# Patient Record
Sex: MALE | Employment: STUDENT
[De-identification: names, ages, dates, MRNs, and addresses within clinical notes are randomized per-mention and may not be internally consistent; named-entity substitution may affect disease eponyms.]

---

## 2017-06-03 ENCOUNTER — HEALTH MAINTENANCE LETTER (OUTPATIENT)
Age: 22
End: 2017-06-03

## 2020-01-28 ENCOUNTER — OFFICE VISIT (OUTPATIENT)
Dept: FAMILY MEDICINE | Facility: CLINIC | Age: 25
End: 2020-01-28
Payer: COMMERCIAL

## 2020-01-28 VITALS
SYSTOLIC BLOOD PRESSURE: 130 MMHG | HEART RATE: 100 BPM | RESPIRATION RATE: 14 BRPM | WEIGHT: 312 LBS | BODY MASS INDEX: 43.68 KG/M2 | DIASTOLIC BLOOD PRESSURE: 80 MMHG | TEMPERATURE: 98 F | OXYGEN SATURATION: 96 % | HEIGHT: 71 IN

## 2020-01-28 DIAGNOSIS — R53.83 OTHER FATIGUE: Primary | ICD-10-CM

## 2020-01-28 DIAGNOSIS — E66.01 MORBID OBESITY (H): Chronic | ICD-10-CM

## 2020-01-28 LAB
ERYTHROCYTE [DISTWIDTH] IN BLOOD BY AUTOMATED COUNT: 12.7 % (ref 10–15)
HBA1C MFR BLD: 5.6 % (ref 0–5.6)
HCT VFR BLD AUTO: 46.5 % (ref 40–53)
HGB BLD-MCNC: 15.9 G/DL (ref 13.3–17.7)
MCH RBC QN AUTO: 28.4 PG (ref 26.5–33)
MCHC RBC AUTO-ENTMCNC: 34.2 G/DL (ref 31.5–36.5)
MCV RBC AUTO: 83 FL (ref 78–100)
PLATELET # BLD AUTO: 305 10E9/L (ref 150–450)
RBC # BLD AUTO: 5.59 10E12/L (ref 4.4–5.9)
WBC # BLD AUTO: 8.8 10E9/L (ref 4–11)

## 2020-01-28 PROCEDURE — 36415 COLL VENOUS BLD VENIPUNCTURE: CPT | Performed by: PHYSICIAN ASSISTANT

## 2020-01-28 PROCEDURE — 99204 OFFICE O/P NEW MOD 45 MIN: CPT | Performed by: PHYSICIAN ASSISTANT

## 2020-01-28 PROCEDURE — 85027 COMPLETE CBC AUTOMATED: CPT | Performed by: PHYSICIAN ASSISTANT

## 2020-01-28 PROCEDURE — 83036 HEMOGLOBIN GLYCOSYLATED A1C: CPT | Performed by: PHYSICIAN ASSISTANT

## 2020-01-28 PROCEDURE — 84443 ASSAY THYROID STIM HORMONE: CPT | Performed by: PHYSICIAN ASSISTANT

## 2020-01-28 ASSESSMENT — MIFFLIN-ST. JEOR: SCORE: 2427.35

## 2020-01-28 NOTE — PATIENT INSTRUCTIONS
Patient Education   Preventing Diabetes  What is diabetes?  Diabetes is a disease that causes high blood sugar. Over time, high blood sugar can lead to a number of health problems if not treated. You can take action to prevent diabetes.  You are at risk for diabetes if you:    Are overweight    Don't get enough exercise    Have a parent, brother or sister with diabetes    Are , , ,  American or     Have high blood pressure (over 130/80)    Have low HDL cholesterol (35 or lower)    Have high triglycerides (150 or higher)    Are over age 45    Have had a baby weighing more than 9 pounds    Have had gestational diabetes (diabetes during pregnancy)    Have PCOS (polycystic ovary syndrome)    Have pre-diabetes  What is pre-diabetes?  Pre-diabetes is when your blood sugar level is higher than normal, but not high enough to be called diabetes.  Pre-diabetes will usually turn into diabetes in a short time if you do not change your health habits.  Damage to your body, especially the heart and blood vessels, may already be occurring with pre-diabetes. If you have pre-diabetes, it is important to start making healthy choices now.  How can I reduce my risk of getting diabetes?  You can prevent or delay getting diabetes with healthy lifestyle choices. These steps can reduce the risk for diabetes in 6 out of 10 people:   1. Exercise 30 minutes a day, at least five times per week (or 150 minutes of exercise per week).  2. Lose weight if you need to. If you are overweight, losing just 5 to 10% of your body weight (an average of 15 pounds) may reduce your risk.  3. Cut back calories in your diet. Choose healthy, nutritious, and unprocessed foods (lean meats, plant proteins, whole grains, fruits and vegetables).  If your doctor diagnosed you with pre-diabetes, you should also see your doctor every year to check for diabetes.  How would I know if I had diabetes?  To check for  diabetes, your doctor will do a blood test to measure your blood sugar. You cannot eat or drink anything for several hours before this test.    If your blood sugar is less than 100, you do not have diabetes.    If it is between 100 and 125, you have pre-diabetes.    If it is 126 or higher on two different days, you have diabetes.  The doctor may order more tests to confirm the results.  What should I do if I get diabetes?  If not managed, diabetes can lead to blindness, kidney failure, nerve damage, heart attack or stroke.  You will greatly reduce your risks if you manage your diabetes. Managing diabetes includes eating healthy, getting more exercise, checking blood sugar and often taking diabetes medicine.  It is also important to:    Learn as much as you can about diabetes. This will give you the tools you need to build healthy habits. Be sure to meet with a diabetes educator.    Work closely with a doctor who understands diabetes.    Get support from family and friends. Support is vital when you are making changes in your life.  How can I find a diabetes prevention and education program?  [facility] offers complete diabetes education, including diabetes prevention and ongoing care. Call [phone] for details or to schedule a visit.  The Centers for Disease Control has a listing of all of the Diabetes Prevention Programs available. Go to https://nccd.cdc.gov/DDT_DPRP/Programs.aspx.  Or call The American Diabetes Association at 4-319-DIABETES and ask for a program near you. You can also check their website at www.diabetes.org.  For informational purposes only. Not to replace the advice of your health care provider.  Clinically reviewed by [facility].  Copyright   2008 [facility]. All rights reserved. [employer] [id number] - REV 02/19.       Patient Education     A1C  Does this test have other names?  Hemoglobin A1c; HbA1c; glycosylated hemoglobin; glycohemoglobin;  Glycated hemoglobin  What is this test?  A1C is a blood test that shows average blood sugar (glucose) levels over the last 3 months. The test is done to find out if a person has diabetes or prediabetes. It's also used to see how well a person with diabetes controls their blood sugar. The test can help guide diabetes treatment over time.  Why do I need this test?  You may need this test to check for prediabetes or diabetes.  If you have diabetes or prediabetes, you may need this test to see how well you control your blood sugar. People with diabetes need to track their blood sugar (glucose) levels every day to make sure they aren t too high or too low. The A1C test gives results for a longer period of time. It shows if your blood sugar has been too high on average over the last 3 months.   Glucose sticks to hemoglobin in the blood. Hemoglobin is a protein in red blood cells that carries oxygen. When blood sugar is high, more glucose builds up and sticks to the hemoglobin. The A1C test measures how much of the hemoglobin is coated with sugar.  You may have the test when a healthcare provider first works with you to treat your diabetes. You may then need to have the A1C test 2 or more times a year. This depends on the type of diabetes you have and how well it s controlled. The American Diabetes Association (ADA) advises an A1C test at least 2 times a year if you are meeting your blood sugar goals. If you aren t meeting your goals or your medicine has changed, you should have the A1C test more often.  What other tests might I have along with this test?   If your healthcare provider tests you for diabetes, you may also have any of these tests:    Fasting plasma glucose blood test (FPG)    Oral glucose tolerance test (OGTT)    Urine test to check for sugar, ketones, or protein  What do my test results mean?  Test results may vary depending on your age, gender, health history, the method used for the test, and other  things. Your test results may not mean you have a problem. Ask your healthcare provider what your test results mean for you.   A1C results are reported as a percentage. Here are what the results mean:    A1C below 5.7%. This is normal.    A1C from 5.7% to 6.4%. You may have prediabetes. This means you have a higher risk for diabetes in the future.    A1C of 6.5% or above on 2 separate tests. You may have diabetes.   The ADA says that people with diabetes should keep an A1C below 7%. The American Association of Clinical Endocrinologists advises an A1C of 6.5% or less. Your healthcare provider may give you other advice. This is based on your age, health conditions, and other things.    How is this test done?  The test is done with a blood sample. A needle is used to draw blood from a vein in your arm or hand.   Does this test pose any risks?  Having a blood test with a needle carries some risks. These include bleeding, infection, bruising, and feeling lightheaded. When the needle pricks your arm or hand, you may feel a slight sting or pain. Afterward, the site may be sore.  What might affect my test results?  Your blood sugar levels change throughout the day. This won't affect the A1C test result.  If you have sickle cell anemia or other blood disorders, an A1C test may be less useful for diagnosing or watching diabetes. Your healthcare provider may tell you to use a different test that will work better for you.  The test results may be less accurate if you have any of the below:    Anemia    Heavy bleeding    Iron deficiency    Kidney failure    Liver disease  How do I get ready for this test?  You don't need to get ready for the test.      4266-1627 [employer]. [address] [place] [address], [place] [address]. All rights reserved. This information is not intended as a substitute for professional medical care. Always follow your healthcare professional's instructions.

## 2020-01-28 NOTE — PROGRESS NOTES
"Subjective     Henrique Walker is a 24 year old male who presents to clinic today for the following health issues:    HPI   fatigue      Duration: several years but worse for past few weeks    Description (location/character/radiation): fatigue, dizzy and headache    Intensity:  mild    Accompanying signs and symptoms: none    History (similar episodes/previous evaluation): None    Precipitating or alleviating factors: None    Therapies tried and outcome: rest     Concerns regarding overall health, diet and weight.  Wants to be tested for diabetes.  Feeling fatigued for several years basically.  Says he knows his diet needs to be better.  Things have been worsening however in the past weeks.  Now he is having headaches and dizziness, so he is here mostly for lab testing.  No known FH of diabetes or thyroid problems.    Reviewed and updated as needed this visit by Provider         Review of Systems   ROS COMP: Constitutional, HEENT, cardiovascular, pulmonary, gi and gu systems are negative, except as otherwise noted.      Objective    /80 (BP Location: Right arm, Patient Position: Chair, Cuff Size: Adult Large)   Pulse 100   Temp 98  F (36.7  C) (Oral)   Resp 14   Ht 1.803 m (5' 11\")   Wt 141.5 kg (312 lb)   SpO2 96%   BMI 43.52 kg/m    Body mass index is 43.52 kg/m .  Physical Exam   GENERAL: healthy, alert and no distress  HENT: ear canals and TM's normal, nose and mouth without ulcers or lesions  NECK: no adenopathy, no asymmetry, masses, or scars and thyroid normal to palpation  RESP: lungs clear to auscultation - no rales, rhonchi or wheezes  CV: regular rate and rhythm, normal S1 S2, no S3 or S4, no murmur, click or rub, no peripheral edema and peripheral pulses strong  ABDOMEN: soft, nontender, no hepatosplenomegaly, no masses and bowel sounds normal  MS: no gross musculoskeletal defects noted, no edema  SKIN: no suspicious lesions or rashes  PSYCH: mentation appears normal, affect " "normal/bright and anxious    Diagnostic Test Results:  Labs reviewed in Epic  Results for orders placed or performed in visit on 01/28/20   CBC with platelets     Status: None   Result Value Ref Range    WBC 8.8 4.0 - 11.0 10e9/L    RBC Count 5.59 4.4 - 5.9 10e12/L    Hemoglobin 15.9 13.3 - 17.7 g/dL    Hematocrit 46.5 40.0 - 53.0 %    MCV 83 78 - 100 fl    MCH 28.4 26.5 - 33.0 pg    MCHC 34.2 31.5 - 36.5 g/dL    RDW 12.7 10.0 - 15.0 %    Platelet Count 305 150 - 450 10e9/L   Hemoglobin A1c     Status: None   Result Value Ref Range    Hemoglobin A1C 5.6 0 - 5.6 %   TSH with free T4 reflex     Status: None   Result Value Ref Range    TSH 2.08 0.40 - 4.00 mU/L           Assessment & Plan     1. Other fatigue  A1C and CBC are normal today.  Will await TSH.    - CBC with platelets  - Hemoglobin A1c  - TSH with free T4 reflex    2. obesity (H)  Discussed that diet and lifestyle likely are contributing to how he has been feeling.    - TSH with free T4 reflex     BMI:   Estimated body mass index is 43.52 kg/m  as calculated from the following:    Height as of this encounter: 1.803 m (5' 11\").    Weight as of this encounter: 141.5 kg (312 lb).   Weight management plan: Discussed healthy diet and exercise guidelines        Patient Instructions     Patient Education   Preventing Diabetes  What is diabetes?  Diabetes is a disease that causes high blood sugar. Over time, high blood sugar can lead to a number of health problems if not treated. You can take action to prevent diabetes.  You are at risk for diabetes if you:    Are overweight    Don't get enough exercise    Have a parent, brother or sister with diabetes    Are , , ,  American or     Have high blood pressure (over 130/80)    Have low HDL cholesterol (35 or lower)    Have high triglycerides (150 or higher)    Are over age 45    Have had a baby weighing more than 9 pounds    Have had gestational diabetes " (diabetes during pregnancy)    Have PCOS (polycystic ovary syndrome)    Have pre-diabetes  What is pre-diabetes?  Pre-diabetes is when your blood sugar level is higher than normal, but not high enough to be called diabetes.  Pre-diabetes will usually turn into diabetes in a short time if you do not change your health habits.  Damage to your body, especially the heart and blood vessels, may already be occurring with pre-diabetes. If you have pre-diabetes, it is important to start making healthy choices now.  How can I reduce my risk of getting diabetes?  You can prevent or delay getting diabetes with healthy lifestyle choices. These steps can reduce the risk for diabetes in 6 out of 10 people:   1. Exercise 30 minutes a day, at least five times per week (or 150 minutes of exercise per week).  2. Lose weight if you need to. If you are overweight, losing just 5 to 10% of your body weight (an average of 15 pounds) may reduce your risk.  3. Cut back calories in your diet. Choose healthy, nutritious, and unprocessed foods (lean meats, plant proteins, whole grains, fruits and vegetables).  If your doctor diagnosed you with pre-diabetes, you should also see your doctor every year to check for diabetes.  How would I know if I had diabetes?  To check for diabetes, your doctor will do a blood test to measure your blood sugar. You cannot eat or drink anything for several hours before this test.    If your blood sugar is less than 100, you do not have diabetes.    If it is between 100 and 125, you have pre-diabetes.    If it is 126 or higher on two different days, you have diabetes.  The doctor may order more tests to confirm the results.  What should I do if I get diabetes?  If not managed, diabetes can lead to blindness, kidney failure, nerve damage, heart attack or stroke.  You will greatly reduce your risks if you manage your diabetes. Managing diabetes includes eating healthy, getting more exercise, checking blood sugar and  often taking diabetes medicine.  It is also important to:    Learn as much as you can about diabetes. This will give you the tools you need to build healthy habits. Be sure to meet with a diabetes educator.    Work closely with a doctor who understands diabetes.    Get support from family and friends. Support is vital when you are making changes in your life.  How can I find a diabetes prevention and education program?  [facility] offers complete diabetes education, including diabetes prevention and ongoing care. Call [phone] for details or to schedule a visit.  The Centers for Disease Control has a listing of all of the Diabetes Prevention Programs available. Go to https://nccd.cdc.gov/DDT_DPRP/Programs.aspx.  Or call The American Diabetes Association at 6-657-DIABETES and ask for a program near you. You can also check their website at www.diabetes.org.  For informational purposes only. Not to replace the advice of your health care provider.  Clinically reviewed by [facility].  Copyright   2008 [facility]. All rights reserved. [employer] [id number] - REV 02/19.       Patient Education     A1C  Does this test have other names?  Hemoglobin A1c; HbA1c; glycosylated hemoglobin; glycohemoglobin; Glycated hemoglobin  What is this test?  A1C is a blood test that shows average blood sugar (glucose) levels over the last 3 months. The test is done to find out if a person has diabetes or prediabetes. It's also used to see how well a person with diabetes controls their blood sugar. The test can help guide diabetes treatment over time.  Why do I need this test?  You may need this test to check for prediabetes or diabetes.  If you have diabetes or prediabetes, you may need this test to see how well you control your blood sugar. People with diabetes need to track their blood sugar (glucose) levels every day to make sure they aren t too high or too low. The A1C test gives results for a  longer period of time. It shows if your blood sugar has been too high on average over the last 3 months.   Glucose sticks to hemoglobin in the blood. Hemoglobin is a protein in red blood cells that carries oxygen. When blood sugar is high, more glucose builds up and sticks to the hemoglobin. The A1C test measures how much of the hemoglobin is coated with sugar.  You may have the test when a healthcare provider first works with you to treat your diabetes. You may then need to have the A1C test 2 or more times a year. This depends on the type of diabetes you have and how well it s controlled. The American Diabetes Association (ADA) advises an A1C test at least 2 times a year if you are meeting your blood sugar goals. If you aren t meeting your goals or your medicine has changed, you should have the A1C test more often.  What other tests might I have along with this test?   If your healthcare provider tests you for diabetes, you may also have any of these tests:    Fasting plasma glucose blood test (FPG)    Oral glucose tolerance test (OGTT)    Urine test to check for sugar, ketones, or protein  What do my test results mean?  Test results may vary depending on your age, gender, health history, the method used for the test, and other things. Your test results may not mean you have a problem. Ask your healthcare provider what your test results mean for you.   A1C results are reported as a percentage. Here are what the results mean:    A1C below 5.7%. This is normal.    A1C from 5.7% to 6.4%. You may have prediabetes. This means you have a higher risk for diabetes in the future.    A1C of 6.5% or above on 2 separate tests. You may have diabetes.   The ADA says that people with diabetes should keep an A1C below 7%. The American Association of Clinical Endocrinologists advises an A1C of 6.5% or less. Your healthcare provider may give you other advice. This is based on your age, health conditions, and other things.    How is  this test done?  The test is done with a blood sample. A needle is used to draw blood from a vein in your arm or hand.   Does this test pose any risks?  Having a blood test with a needle carries some risks. These include bleeding, infection, bruising, and feeling lightheaded. When the needle pricks your arm or hand, you may feel a slight sting or pain. Afterward, the site may be sore.  What might affect my test results?  Your blood sugar levels change throughout the day. This won't affect the A1C test result.  If you have sickle cell anemia or other blood disorders, an A1C test may be less useful for diagnosing or watching diabetes. Your healthcare provider may tell you to use a different test that will work better for you.  The test results may be less accurate if you have any of the below:    Anemia    Heavy bleeding    Iron deficiency    Kidney failure    Liver disease  How do I get ready for this test?  You don't need to get ready for the test.      8405-9011 [employer]. [address] [place] [address], [place] [address]. All rights reserved. This information is not intended as a substitute for professional medical care. Always follow your healthcare professional's instructions.               Return in about 3 months (around 4/28/2020) for if symptoms worsen or fail to improve.    Julio Maciel PA-C  [facility]

## 2020-01-30 LAB — TSH SERPL DL<=0.005 MIU/L-ACNC: 2.08 MU/L (ref 0.4–4)

## 2020-02-03 ENCOUNTER — TELEPHONE (OUTPATIENT)
Dept: FAMILY MEDICINE | Facility: CLINIC | Age: 25
End: 2020-02-03

## 2020-02-03 NOTE — TELEPHONE ENCOUNTER
Pt calling to review labs-  Notified all look in range but provider has not yet reviewed them and there is no plan.     Once provider reviews she will f/u with a plan.     Pt expressed understanding and acceptance of the plan.  Pt had no further questions at this time.  Advised can call back to clinic at any time with concerns.       Khadijah Roy RN